# Patient Record
(demographics unavailable — no encounter records)

---

## 2025-01-27 NOTE — HISTORY OF PRESENT ILLNESS
[FreeTextEntry2] : Leena is a 16 year 6 month old female with insulin dependent diabetes (weakly positive LIZZ Ab with family hx of T1D) and primary amenorrhea who returns for follow up. She was diagnosed in 3/2021 by Dr. Bond. She presented to [facility] due to increased thirst, urination, fatigue, 20 lb weight loss and vomiting; d-stick was 594 mg/dL (H), A1c 12.6 % (H); insulin, islet cell and zinc transporter 8 Abs negative; she was in mild DKA (pH 7.29, HCO3 19) which quickly resolved. [place] [facility] [place]. She was started on a subcutaneous basal bolus insulin regimen. Metformin was prescribed but never started. Her A1c improved to 6.7 % by 2021. Dexcom ordered but never started. Tried metformin for 1 week then discontinued due to headaches. Noted to have weakly positive LIZZ Ab (0.03 nmol/L) in 2021. She transferred care to me in 2021 (A1c 5.2 %). Transitioned to Dexcom in 2023. She last saw me in 2024 (A1c 12.4 %) and 2024. Screening labs from 2023 normal.  The family lives in a shelter in [place]. Mother was hoping to move in 2021 but the house failed inspection (mold in walls). Still in shelter. Father  from complications of T1D ~; he was diagnosed ~ 12 years old; he took insulin, he was blind. There is a family history of type 2 diabetes.  Leena reported being premenarchal still in 3/2023 (age 14y8m); prior visit with me was in 2022 (age 13y9m). Screening labs ordered on 3/29/23 that showed: SHBG 15.6 nmol/L (L), glucose 423 mg/dL (H); normal: total/free testosterone, androstenedione, DHEAS, 17OHP, FSH, LH, estradiol, hCG, prolactin, lipid panel. Repeat labs on 23 showed pubertal LH, FSH and estradiol. Pelvic u/s from 23 was normal; endometrial stripe 4 mm. Spotting in 2023. Prescribed Provera but Leena did not want to take it. Prescribed again in 2024, but did not take. Discussed taking at visit in 2024.   Leena now returns for follow up and her A1c is 13.1 %. Review of her glucometer shows occasional blood sugars ranging mostly from mid 200s-400s - occasional numbers in the 500s. Denies any lows. Random blood sugar in office at 4:15 pm was 287 mg/dL (date on meter was 22). I programed the date and time on meter today. Interested in Dexcom.   Still has not taken Provera yet - says today that she was not ready, but now Leena is ready.   25 - mother now works for security at [employer].   Mother uses an insulin calculator on her phone. Leena downloaded an insulin calculator sana on her phone today.  Has a counselor at school that she talks to daily.  Leena has received 2 COVID vaccines.  plan basaglar 39 to 42 [Premenarchal] : premenarchal

## 2025-05-17 NOTE — CONSULT LETTER
[Dear  ___] : Dear  [unfilled], [Courtesy Letter:] : I had the pleasure of seeing your patient, [unfilled], in my office today. [Please see my note below.] : Please see my note below. [Sincerely,] : Sincerely, [FreeTextEntry3] : Katalina Ferreira DO

## 2025-05-17 NOTE — DISCUSSION/SUMMARY
[FreeTextEntry1] : Leena is a 16 year 10 month old female with insulin dependent diabetes (weakly positive LIZZ Ab with family hx of T1D) and primary amenorrhea who returns for follow up. Her A1c today is 12.3 % - which indicates poor glycemic control. She briefly wore a Dexcom. Last Dexcom reading from 2/14-2/27/25 - Dexcom data showed that her average glucose was 365 mg/dL +/- 39; she was very high 100 % of the time. She stopped using the Dexcom soon after because mother did not pay her phone bill. Renewed Dexcom . Continues to not take insulin regularly. Recommended increasing her basalgar from 42 to 45 units daily; again stressed the need to aim for giving short acting insulin 4-5 times/day at minimum. Diabetes is a chronic condition and improved glycemic control is necessary in order to decrease Leena's future risk of developing acute and/or chronic complications.  Next follow up with me in 3 months and 6 months.  Leena remains premenarchal. FSH, LH and estradiol in 11/2023 were pubertal. TFTs normal. SHBG low in 3/2023 but androgens normal. Prolactin previously normal too. Pelvic ultrasound normal in 11/2023. Previously prescribed Provera multiple times - sent in new script again today- plans to take Provera.

## 2025-05-17 NOTE — HISTORY OF PRESENT ILLNESS
[Premenarchal] : premenarchal [FreeTextEntry2] : Leena is a 16 year 10 month old female with insulin dependent diabetes (weakly positive LIZZ Ab with family hx of T1D) and primary amenorrhea who returns for follow up. She was diagnosed in 3/2021 by Dr. Bond. She presented to [facility] due to increased thirst, urination, fatigue, 20 lb weight loss and vomiting; d-stick was 594 mg/dL (H), A1c 12.6 % (H); insulin, islet cell and zinc transporter 8 Abs negative; she was in mild DKA (pH 7.29, HCO3 19) which quickly resolved. [place] [facility] [place]. She was started on a subcutaneous basal bolus insulin regimen. Metformin was prescribed but never started. Her A1c improved to 6.7 % by 2021. Dexcom ordered but never started. Tried metformin for 1 week then discontinued due to headaches. Noted to have weakly positive LIZZ Ab (0.03 nmol/L) in 2021. She transferred care to me in 2021 (A1c 5.2 %). Transitioned to Dexcom in 2023. She last saw me in 2024 and 2025 (A1c 13.1 %). Met with nursing for Dexcom training in 2025. Screening labs from 2023 normal. Labs from 25: CMP (glucose 413 mg/dL (H)), abnormal lipid panel (total 193,  (H), HDL 45, ); normal: remainder of CMP, urine microalbumin. LH, FSH and estradiol pubertal.   The family lives in a shelter in [place]. Mother was hoping to move in 2021 but the house failed inspection (mold in walls). Still in shelter. Father  from complications of T1D ~2018; he was diagnosed ~ 12 years old; he took insulin, he was blind. There is a family history of type 2 diabetes.  Leena reported being premenarchal still in 3/2023 (age 14y8m); prior visit with me was in 2022 (age 13y9m). Screening labs ordered on 3/29/23 that showed: SHBG 15.6 nmol/L (L), glucose 423 mg/dL (H); normal: total/free testosterone, androstenedione, DHEAS, 17OHP, FSH, LH, estradiol, hCG, prolactin, lipid panel. Repeat labs on 23 showed pubertal LH, FSH and estradiol. Pelvic u/s from 23 was normal; endometrial stripe 4 mm. Spotting in fall . Prescribed Provera but Leena did not want to take it. Prescribed again in 2024, but did not take. Discussed taking at visit in 2024.  Leena now returns for follow up and her A1c is 12.3 %.  here with the family today - has been working the family for 2 years. Recently completed Dexcom training - last Dexcom reading from -25 - Dexcom data showed that her average glucose was 365 mg/dL +/- 39; she was very high 100 % of the time. She stopped using the Dexcom soon after because mother did not pay her phone bill. Leena did not use the , but they have one at home.  She does not have her glucometer with her (in the car?). Leena says she does not check often. She has the bolus calc appt on her phone - unsure how many times/day.   She still has not taken Provera yet - says she is now ready to take it.   Nina has been falling asleep during the day; mother concerned about sleep apnea. Leena stays up late to talk to friends and watch movies.   25 - mother now works for security at [employer] [address].  Mother uses an insulin calculator on her phone. Leena downloaded an insulin calculator sana on her phone today.  Has a counselor at school that she talks to daily.  Leena has received 2 COVID vaccines.

## 2025-05-17 NOTE — PHYSICAL EXAM
[Healthy Appearing] : healthy appearing [Well Nourished] : well nourished [Interactive] : interactive [Obese] : obese [Normal Appearance] : normal appearance [Well formed] : well formed [Normally Set] : normally set [Goiter] : no goiter [Abdomen Soft] : soft [Abdomen Tenderness] : non-tender [] : no hepatosplenomegaly [Normal] : normal

## 2025-05-17 NOTE — HISTORY OF PRESENT ILLNESS
[Premenarchal] : premenarchal [FreeTextEntry2] : Leena is a 16 year 10 month old female with insulin dependent diabetes (weakly positive LIZZ Ab with family hx of T1D) and primary amenorrhea who returns for follow up. She was diagnosed in 3/2021 by Dr. Bond. She presented to [facility] due to increased thirst, urination, fatigue, 20 lb weight loss and vomiting; d-stick was 594 mg/dL (H), A1c 12.6 % (H); insulin, islet cell and zinc transporter 8 Abs negative; she was in mild DKA (pH 7.29, HCO3 19) which quickly resolved. She was [place] [facility] [place] and [place] education. She was started on a subcutaneous basal bolus insulin regimen. Metformin was prescribed but never started. Her A1c improved to 6.7 % by 2021. Dexcom ordered but never started. Tried metformin for 1 week then discontinued due to headaches. Noted to have weakly positive LIZZ Ab (0.03 nmol/L) in 2021. She transferred care to me in 2021 (A1c 5.2 %). Transitioned to Dexcom in 2023. She last saw me in 2024 and 2025 (A1c 13.1 %). Met with nursing for Dexcom training in 2025. Screening labs from 2023 normal. Labs from 25: CMP (glucose 413 mg/dL (H)), abnormal lipid panel (total 193,  (H), HDL 45, ); normal: remainder of CMP, urine microalbumin. LH, FSH and estradiol pubertal.   The family lives in a shelter in [place]. Mother was hoping to move in 2021 but the house failed inspection (mold in walls). Still in shelter. Father  from complications of T1D ~2018; he was diagnosed ~ 12 years old; he took insulin, he was blind. There is a family history of type 2 diabetes.  Leena reported being premenarchal still in 3/2023 (age 14y8m); prior visit with me was in 2022 (age 13y9m). Screening labs ordered on 3/29/23 that showed: SHBG 15.6 nmol/L (L), glucose 423 mg/dL (H); normal: total/free testosterone, androstenedione, DHEAS, 17OHP, FSH, LH, estradiol, hCG, prolactin, lipid panel. Repeat labs on 23 showed pubertal LH, FSH and estradiol. Pelvic u/s from 23 was normal; endometrial stripe 4 mm. Spotting in fall . Prescribed Provera but Leena did not want to take it. Prescribed again in 2024, but did not take. Discussed taking at visit in 2024.  Leena now returns for follow up and her A1c is 12.3 %.  here with the family today - has been working the family for 2 years. Recently completed Dexcom training - last Dexcom reading from -25 - Dexcom data showed that her average glucose was 365 mg/dL +/- 39; she was very high 100 % of the time. She stopped using the Dexcom soon after because mother did not pay her phone bill. Leena did not use the , but they have one at home.  She does not have her glucometer with her (in the car?). Leena says she does not check often. She has the bolus calc appt on her phone - unsure how many times/day.   She still has not taken Provera yet - says she is now ready to take it.   Nina has been falling asleep during the day; mother concerned about sleep apnea. Leena stays up late to talk to friends and watch movies.   25 - mother now works for security at [address].  Mother uses an insulin calculator on her phone. Leena downloaded an insulin calculator sana on her phone today.  Has a counselor at school that she talks to daily.  Leena has received 2 COVID vaccines.